# Patient Record
Sex: MALE | Race: OTHER
[De-identification: names, ages, dates, MRNs, and addresses within clinical notes are randomized per-mention and may not be internally consistent; named-entity substitution may affect disease eponyms.]

---

## 2020-08-18 ENCOUNTER — HOSPITAL ENCOUNTER (EMERGENCY)
Dept: HOSPITAL 61 - ER | Age: 35
Discharge: HOME | End: 2020-08-18
Payer: COMMERCIAL

## 2020-08-18 VITALS — BODY MASS INDEX: 22.72 KG/M2 | HEIGHT: 68 IN | WEIGHT: 149.91 LBS

## 2020-08-18 VITALS — SYSTOLIC BLOOD PRESSURE: 147 MMHG | DIASTOLIC BLOOD PRESSURE: 96 MMHG
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
## 2020-08-18 DIAGNOSIS — S01.112A: Primary | ICD-10-CM
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
## 2020-08-18 PROCEDURE — 99282 EMERGENCY DEPT VISIT SF MDM: CPT

## 2020-08-18 PROCEDURE — 12013 RPR F/E/E/N/L/M 2.6-5.0 CM: CPT

## 2020-08-18 PROCEDURE — 12011 RPR F/E/E/N/L/M 2.5 CM/<: CPT

## 2020-08-18 NOTE — PHYS DOC
Past Medical History


Past Medical History:  No Pertinent History


Past Surgical History:  No Surgical History


Smoking Status:  Never Smoker


Alcohol Use:  Occasionally





General Adult


EDM:


Chief Complaint:  LACERATION/AVULSION





HPI:


HPI:





Patient is a 35  year old male who presents with was working on setting up work 

at home with a ran into it generally seen and received a exam cut above the left

eyebrow.  The laceration was in a hook shaped and approximately 20 minutes of 

most of my 2 inches long.  He denies LOC, dizziness, headache, eye movement 

pain, vision changes, numbness or tingling, nausea, vomiting, neck pain.  He is 

not on any blood thinners.  Patient denies any medical history.  He does not 

know when his last tetanus shot was. Denies any pain.





Review of Systems:


Review of Systems:


Constitutional:   Denies fever or chills. []


Eyes:   Denies change in visual acuity. []


HENT:   Denies nasal congestion or sore throat. [] 


Respiratory:   Denies cough or shortness of breath. [] 


Cardiovascular:   Denies chest pain or edema. [] 


GI:   Denies abdominal pain, nausea, vomiting, bloody stools or diarrhea. [] 


:  Denies dysuria. [] 


Musculoskeletal:   Denies back pain or joint pain. [] 


Integument:   Denies rash. Left eye brow laceration.[] 


Neurologic:   Denies headache, focal weakness or sensory changes. [] 


Endocrine:   Denies polyuria or polydipsia. [] 


Lymphatic:  Denies swollen glands. [] 


Psychiatric:  Denies depression or anxiety. []





Heart Score:


Risk Factors:


Risk Factors:  DM, Current or recent (<one month) smoker, HTN, HLP, family 

history of CAD, obesity.


Risk Scores:


Score 0 - 3:  2.5% MACE over next 6 weeks - Discharge Home


Score 4 - 6:  20.3% MACE over next 6 weeks - Admit for Clinical Observation


Score 7 - 10:  72.7% MACE over next 6 weeks - Early Invasive Strategies





Current Medications:





Current Medications








 Medications


  (Trade)  Dose


 Ordered  Sig/Stan  Start Time


 Stop Time Status Last Admin


Dose Admin


 


 Lidocaine HCl


  (Lidocaine 1%


 20ml Vial)  20 ml  1X  ONCE  8/18/20 17:00


 8/18/20 17:01   














Allergies:


Allergies:





Allergies








Coded Allergies Type Severity Reaction Last Updated Verified


 


  No Known Drug Allergies    8/18/20 No











Physical Exam:


PE:





Constitutional: Well developed, well nourished, no acute distress, non-toxic 

appearance. []


HENT: Normocephalic, atraumatic, bilateral external ears normal, oropharynx 

moist, no oral exudates, nose normal. []


Eyes: PERRLA, EOMI, conjunctiva normal, no discharge. [] 


Neck: Normal range of motion, no tenderness, supple, no stridor. [] 


Cardiovascular:Heart rate regular rhythm, no murmur []


Lungs & Thorax:  Bilateral breath sounds clear to auscultation []


Abdomen: Bowel sounds normal, soft, no tenderness, no masses, no pulsatile 

masses. [] 


Skin: Warm, dry, no erythema, no rash. Left eye brow laceration. [] 


Back: No tenderness, no CVA tenderness. [] 


Extremities: No tenderness, no cyanosis, no clubbing, ROM intact, no edema. [] 


Neurologic: Alert and oriented X 3, normal motor function, normal sensory 

function, no focal deficits noted. []


Psychologic: Affect normal, judgement normal, mood normal. []





Current Patient Data:


Vital Signs:





                                   Vital Signs








  Date Time  Temp Pulse Resp B/P (MAP) Pulse Ox O2 Delivery O2 Flow Rate FiO2


 


8/18/20 16:49 98.2 85 18 147/96 (113) 96   





 98.2       











EKG:


EKG:


[]





Radiology/Procedures:


Radiology/Procedures:


[]





Course & Med Decision Making:


Course & Med Decision Making


Pertinent Labs and Imaging studies reviewed. (See chart for details)





See HPI.  Bleeding is controlled.





Laceration repair





Location: Left eye brow





Local anesthesia: None needed





Interrupted sutures/Internal sutures: Derma bond and 3 Steri Strips





Nerve/ligament/muscle damage: None





Cleaning and irrigation: Chlorhexidine and saline





The appropriate timeout was taken.  The area was prepped and draped in the usual

 sterile fashion.  The wound was copiously irrigated with normal saline and 

chlorhexidine.  Patient tolerated well without complication.  Dressing was 

applied to the area follow-up education is given to observe for signs and symp

toms of infection, bleeding and to follow-up promptly if these occur.  Patient 

can return in 48 hours for a wound recheck.  Sutures to be removed in 7 to 10 

days.











[]





Dragon Disclaimer:


Dragon Disclaimer:


This electronic medical record was generated, in whole or in part, using a voice

 recognition dictation system.





Departure


Departure


Impression:  


   Primary Impression:  


   Laceration


Disposition:  01 HOME, SELF-CARE


Condition:  STABLE


Referrals:  


NO PCP (PCP)


Patient Instructions:  Laceration Care, Adult, Skin Adhesive Strip Removal, 

Stitches, Staples or Skin Adhesive Strips, Easy-to-Read





Additional Instructions:  


Keep area clean and dry. Do not submerse the area in water for long periods of 

time. Do not apply any ointment.





Justicifation of Admission Dx:


Justifications for Admission:


Justification of Admission Dx:  N/A











JOHN CHAVEZ APRN            Aug 18, 2020 17:04